# Patient Record
(demographics unavailable — no encounter records)

---

## 2024-10-17 NOTE — HISTORY OF PRESENT ILLNESS
[de-identified] : Spitting up, crying [FreeTextEntry6] : Spitting up once a day to every other day, but mom concerned spit up are large amounts. Can happen with positioning.  Baby is also fussy, not usually around feeds but gas. Will look a little uncomfortable before gas/bowel movement but stools will always come out soft. Mom currently on antibiotics (Dicloxacillin) for mastitis, currently exclusively breastfeeding. Will stool once a day or every other day.

## 2024-10-17 NOTE — HISTORY OF PRESENT ILLNESS
[de-identified] : Spitting up, crying [FreeTextEntry6] : Spitting up once a day to every other day, but mom concerned spit up are large amounts. Can happen with positioning.  Baby is also fussy, not usually around feeds but gas. Will look a little uncomfortable before gas/bowel movement but stools will always come out soft. Mom currently on antibiotics (Dicloxacillin) for mastitis, currently exclusively breastfeeding. Will stool once a day or every other day.

## 2024-10-17 NOTE — DISCUSSION/SUMMARY
[FreeTextEntry1] : Despite reported large spit ups, baby is gaining weight beautifully on EBM. Provided mother reassurance and discussed reflux precautions.   Baby also with infant dyschezia. Discussed this is within normal limits for babies, continue to monitor stool output and appearance, can return if concerns or changes arise.

## 2024-10-22 NOTE — HISTORY OF PRESENT ILLNESS
[Mother] : mother [Breast milk] : breast milk [Hours between feeds ___] : Child is fed every [unfilled] hours [Vitamins ___] : Patient takes [unfilled] vitamins daily [Normal] : Normal [Frequency of stools: ___] : Frequency of stools: [unfilled]  stools [per day] : per day. [Yellow] : yellow [Seedy] : seedy [In Bassinet/Crib] : sleeps in bassinet/crib [On back] : sleeps on back [No] : No cigarette smoke exposure [Water heater temperature set at <120 degrees F] : Water heater temperature set at <120 degrees F [Rear facing car seat in back seat] : Rear facing car seat in back seat [Carbon Monoxide Detectors] : Carbon monoxide detectors at home [Smoke Detectors] : Smoke detectors at home. [Co-sleeping] : no co-sleeping [Loose bedding, pillow, toys, and/or bumpers in crib] : no loose bedding, pillow, toys, and/or bumpers in crib [At risk for exposure to TB] : Not at risk for exposure to Tuberculosis

## 2024-10-22 NOTE — PHYSICAL EXAM
[Alert] : alert [Normocephalic] : normocephalic [Flat Open Anterior [place]] : flat open anterior fontanelle [PERRL] : PERRL [Red Reflex Bilateral] : red reflex bilateral [Normally Placed Ears] : normally placed ears [Auricles Well Formed] : auricles well formed [Clear Tympanic membranes] : clear tympanic membranes [Light reflex present] : light reflex present [Bony landmarks visible] : bony landmarks visible [Nares Patent] : nares patent [Palate Intact] : palate intact [Uvula Midline] : uvula midline [Supple, full passive range of motion] : supple, full passive range of motion [Symmetric Chest Rise] : symmetric chest rise [Clear to Auscultation Bilaterally] : clear to auscultation bilaterally [Regular Rate and Rhythm] : regular rate and rhythm [S1, S2 present] : S1, S2 present [+2 Femoral Pulses] : +2 femoral pulses [Soft] : soft [Bowel Sounds] : bowel sounds present [Normal external genitailia] : normal external genitalia [Patent Vagina] : vagina patent [Normally Placed] : normally placed [No Abnormal Lymph Nodes Palpated] : no abnormal lymph nodes palpated [Symmetric Flexed Extremities] : symmetric flexed extremities [Startle Reflex] : startle reflex present [Suck Reflex] : suck reflex present [Rooting] : rooting reflex present [Palmar Grasp] : palmar grasp reflex present [Plantar Grasp] : plantar grasp reflex present [Symmetric Yvon] : symmetric [place] [Acute Distress] : no acute distress [Discharge] : no discharge [Palpable Masses] : no palpable masses [Murmurs] : no murmurs [Tender] : nontender [Distended] : not distended [Hepatomegaly] : no hepatomegaly [Splenomegaly] : no splenomegaly [Clitoromegaly] : no clitoromegaly [Wright-Ortolani] : negative Wright-Ortolani [Spinal Dimple] : no spinal dimple [Tuft of Hair] : no tuft of hair [Rash and/or lesion present] : no rash/lesion

## 2024-11-18 NOTE — DISCUSSION/SUMMARY
[FreeTextEntry1] : Symptoms likely due to viral URI.  Children can get 6-10 colds per year and they are often clustered during the fall and winter.  Generally if the cough is keeping the child up more than 2 nights in a row in a significant way, that could be 1 concerning reason to consider returning.  Otherwise shortness of breath, lethargy, or irritability that is highly disruptive to sleep could be warning signs that would warrant evaluation as well.  Additionally, fevers that are trending higher after 3 days may be a sign of a complication that warrants evaluation.   If fevers occur, they tend to be at their highest on day one or two of fever, then trend lower.  It is not necessary to treat fevers for the sake of lowering the body temperature.  Treating fevers does not make children safer and does not lower the risk of a febrile seizure (a seizure associated with fever).  Febrile seizures are uncommon, and when they occur do not hurt the child.  But they can be upsetting understandably so to the parents.  However, children that do have an underlying seizure disorder may benefit from treating the fevers.  Fevers do not necessarily respond to fever medication; and if they do not it is not necessarily a bad sign. Patients may appear more ill when the fever is trending higher, but should be acting somewhat better when the fever is down. When fevers are present they typically tend to come a and go a few times each day, and tend to be worse at night.    Give supportive care including treatment for discomfort.  Follow up as needed for fever trend, new, or worsening symptoms.  Provide more frequent fluids and food as the intake is often in smaller more frequent amounts.   Consider nasal saline, suction only if it provides comfort or easier breathing. Follow up as needed for fever trend, new, or worsening symptoms.   Reviewed benefits and limitations of testing.  [email or web address] for reference: Tools and Tips and link to symptom .    Goal: Pt will improve balance one half grade to improve performance and safety of transfers and ambulation in 4 weeks.

## 2024-11-18 NOTE — HISTORY OF PRESENT ILLNESS
[de-identified] : Cough [FreeTextEntry6] : There has been no fever, but a few days runny nose and cough, although not been severely disruptive to sleep or activities.  No irritability or lethargy.  There has been only mild decrease in oral intake, there are minimal GI symptoms and no signs of dehydration.

## 2024-11-18 NOTE — HISTORY OF PRESENT ILLNESS
[de-identified] : Cough [FreeTextEntry6] : There has been no fever, but a few days runny nose and cough, although not been severely disruptive to sleep or activities.  No irritability or lethargy.  There has been only mild decrease in oral intake, there are minimal GI symptoms and no signs of dehydration.

## 2024-12-05 NOTE — PHYSICAL EXAM
[No Acute Distress] : no acute distress [NL] : soft, nontender, nondistended, normal bowel sounds, no hepatosplenomegaly [No Abnormal Lymph Nodes Palpated] : no abnormal lymph nodes palpated [Warm] : warm [de-identified] : Beefy red skin folds to the inguinal area.  Flaky dry patches to the torso and extremities.

## 2024-12-05 NOTE — PHYSICAL EXAM
[No Acute Distress] : no acute distress [NL] : soft, nontender, nondistended, normal bowel sounds, no hepatosplenomegaly [No Abnormal Lymph Nodes Palpated] : no abnormal lymph nodes palpated [Warm] : warm [de-identified] : Beefy red skin folds to the inguinal area.  Flaky dry patches to the torso and extremities.

## 2024-12-05 NOTE — HISTORY OF PRESENT ILLNESS
[FreeTextEntry6] : LINDA IBANEZ is a 3 month old female presenting for complaints of eczema which mom thinks is worsening.  She is breastfeeding only. She uses unscented detergents and soaps and there has been no recent exposures.   There is a family hx of eczema.

## 2024-12-05 NOTE — DISCUSSION/SUMMARY
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  Reassurance was provided to mother regarding her breast milk.   We discussed eczema care at length. Eczema is mild at this time but if it becomes moderate to severe, she should have peanut allergy testing prior to initiating peanut.  Nystatin to intertrigo.   Recommend daily moisturizer and topical steroid prn for atopic dermatitis outbreaks creating discomfort. Vaseline should be used liberally. Topical steroids should be used infrequently and should not be used on the face as they may permanently lighten skin color. Limit hot water bathing during acute flairs. It is OK to bathe baby every 2-3 days during such flair ups. Avoid skin products which are perfumed or have coloring or dye. Hypoallergenic preparations are best for eczema.

## 2024-12-23 NOTE — DISCUSSION/SUMMARY
[] : The components of the vaccine(s) to be administered today are listed in the plan of care. The disease(s) for which the vaccine(s) are intended to prevent and the risks have been discussed with the caretaker.  The risks are also included in the appropriate vaccination information statements which have been provided to the patient's caregiver.  The caregiver has given consent to vaccinate. [FreeTextEntry1] : 4m WCC growing and developing beautifully. Discussed sleeping, feeding, reflux, tummy time, and vaccines. Will get Pentacel, PCV20, and Rota today. Return in 2 months for 6m WCC.  Continue feeding ad sanaz. When in car, patient should be in rear-facing car seat in back seat. Put baby to sleep on back, in own crib with no loose or soft bedding. Lower crib mattress. Help baby to maintain sleep and feeding routines. May offer pacifier if needed. Continue tummy time when awake.

## 2024-12-23 NOTE — PHYSICAL EXAM
[Alert] : alert [Acute Distress] : no acute distress [Normocephalic] : normocephalic [Flat Open Anterior [place]] : flat open anterior fontanelle [Red Reflex] : red reflex bilateral [PERRL] : PERRL [Normally Placed Ears] : normally placed ears [Auricles Well Formed] : auricles well formed [Clear Tympanic membranes] : clear tympanic membranes [Light reflex present] : light reflex present [Bony landmarks visible] : bony landmarks visible [Discharge] : no discharge [Nares Patent] : nares patent [Palate Intact] : palate intact [Uvula Midline] : uvula midline [Palpable Masses] : no palpable masses [Symmetric Chest Rise] : symmetric chest rise [Clear to Auscultation Bilaterally] : clear to auscultation bilaterally [Regular Rate and Rhythm] : regular rate and rhythm [S1, S2 present] : S1, S2 present [Murmurs] : no murmurs [+2 Femoral Pulses] : (+) 2 femoral pulses [Soft] : soft [Tender] : nontender [Distended] : nondistended [Bowel Sounds] : bowel sounds present [Hepatomegaly] : no hepatomegaly [Splenomegaly] : no splenomegaly [Normal External Genitalia] : normal external genitalia [Clitoromegaly] : no clitoromegaly [Normal Vaginal Introitus] : normal vaginal introitus [Patent] : patent [Normally Placed] : normally placed [No Abnormal Lymph Nodes Palpated] : no abnormal lymph nodes palpated [Wright-Ortolani] : negative Wright-Ortolani [Allis Sign] : negative Allis sign [Spinal Dimple] : no spinal dimple [Tuft of Hair] : no tuft of hair [Startle Reflex] : startle reflex present [Plantar Grasp] : plantar grasp reflex present [Symmetric Yvon] : symmetric yvon [Rash or Lesions] : no rash/lesions

## 2024-12-23 NOTE — HISTORY OF PRESENT ILLNESS
[Parents] : parents [Breast milk] : breast milk [Vitamins ___] : Patient takes [unfilled] vitamins daily [Normal] : Normal [Yellow] : yellow [Seedy] : seedy [In Bassinet/Crib] : sleeps in bassinet/crib [On back] : sleeps on back [Tummy time] : tummy time [Sleeps 12-16 hours per 24 hours (including naps)] : sleeps 12-16 hours per 24 hours (including naps) [Water heater temperature set at <120 degrees F] : Water heater temperature set at <120 degrees F [Rear facing car seat in back seat] : Rear facing car seat in back seat [Carbon Monoxide Detectors] : Carbon monoxide detectors at home [Smoke Detectors] : Smoke detectors at home. [FreeTextEntry7] : Mother recently returned to work. Had some distress in the past week, reason why EPDS is high. Now feeling better, currently on Zoloft, receiving help.

## 2025-01-14 NOTE — DISCUSSION/SUMMARY
[FreeTextEntry1] : The pathophysiology of nasolacrimal duct obstruction reviewed.  Massage techniques and judicious use of antibiotics reviewed.  If tearing is persistent past 6 months he should see a pediatric ophthalmologist to discuss the option of probing.  The window of time to treat definitively between 9 to 12 months and the risks of not treating within that window reviewed.

## 2025-02-21 NOTE — HISTORY OF PRESENT ILLNESS
[Parents] : parents [Breast milk] : breast milk [Fruits] : fruits [Vegetables] : vegetables [Normal] : Normal [In Bassinet/Crib] : sleeps in bassinet/crib [Sleeps 12-16 hours per 24 hours (including naps)] : sleeps 12-16 hours per 24 hours (including naps) [Tummy time] : tummy time [Water heater temperature set at <120 degrees F] : Water heater temperature set at <120 degrees F [Rear facing car seat in back seat] : Rear facing car seat in back seat [Carbon Monoxide Detectors] : Carbon monoxide detectors at home [Smoke Detectors] : Smoke detectors at home. [de-identified] : Recently recovered from the flu, likely flu A.

## 2025-02-21 NOTE — PHYSICAL EXAM
[Alert] : alert [Normocephalic] : normocephalic [Flat Open Anterior [place]] : flat open anterior fontanelle [Red Reflex] : red reflex bilateral [PERRL] : PERRL [Normally Placed Ears] : normally placed ears [Auricles Well Formed] : auricles well formed [Clear Tympanic membranes] : clear tympanic membranes [Light reflex present] : light reflex present [Bony landmarks visible] : bony landmarks visible [Nares Patent] : nares patent [Palate Intact] : palate intact [Uvula Midline] : uvula midline [Supple, full passive range of motion] : supple, full passive range of motion [Symmetric Chest Rise] : symmetric chest rise [Clear to Auscultation Bilaterally] : clear to auscultation bilaterally [Regular Rate and Rhythm] : regular rate and rhythm [S1, S2 present] : S1, S2 present [+2 Femoral Pulses] : (+) 2 femoral pulses [Soft] : soft [Bowel Sounds] : bowel sounds present [Normal External Genitalia] : normal external genitalia [Patent] : patent [Normally Placed] : normally placed [No Abnormal Lymph Nodes Palpated] : no abnormal lymph nodes palpated [Symmetric Buttocks Creases] : symmetric buttocks creases [Plantar Grasp] : plantar grasp reflex present [Cranial Nerves Grossly Intact] : cranial nerves grossly intact [Acute Distress] : no acute distress [Discharge] : no discharge [Tooth Eruption] : no tooth eruption [Palpable Masses] : no palpable masses [Murmurs] : no murmurs [Tender] : nontender [Distended] : nondistended [Hepatomegaly] : no hepatomegaly [Splenomegaly] : no splenomegaly [Clitoromegaly] : no clitoromegaly [Wright-Ortolani] : negative Wright-Ortolani [Allis Sign] : negative Allis sign [Spinal Dimple] : no spinal dimple [Tuft of Hair] : no tuft of hair [Rash or Lesions] : no rash/lesions [de-identified] : labial adhesion with urethral opening

## 2025-02-21 NOTE — DISCUSSION/SUMMARY
[] : The components of the vaccine(s) to be administered today are listed in the plan of care. The disease(s) for which the vaccine(s) are intended to prevent and the risks have been discussed with the caretaker.  The risks are also included in the appropriate vaccination information statements which have been provided to the patient's caregiver.  The caregiver has given consent to vaccinate. [FreeTextEntry1] : 6m WCC growing and developing beautifully. Discussed sleeping, feeding, solids, tummy time, and vaccines. Will get Vaxelis, PCV20, and Rota today, as well as Flu #1. Return in 3 months for 9m WCC.  Family lives in old house, recently repainted, but unsure about lead pipes. Will look into this matter, consider lead bloodwork earlier if needed at next 9m WCC.  Recommend breastfeeding, 8-12 feedings per day. If formula is needed, 2-4 oz every 3-4 hrs. Introduce single-ingredient foods rich in iron, one at a time. Incorporate up to 4 oz of fluorinated water daily in a sippy cup. When teeth erupt wipe daily with washcloth. When in car, patient should be in rear-facing car seat in back seat. Put baby to sleep on back, in own crib with no loose or soft bedding. Lower crib mattress. Help baby to maintain sleep and feeding routines. May offer pacifier if needed. Continue tummy time when awake. Ensure home is safe since baby is now more mobile. Do not use infant walker. Read aloud to baby.

## 2025-04-10 NOTE — HISTORY OF PRESENT ILLNESS
[de-identified] : Poss pink eye  [FreeTextEntry6] : woke up with left eye pink and crusting today from sleep. Not completely crusted shut but lots of yellow thick discharge.   Also used betamethasone cream without much changes x 2 weeks.

## 2025-04-10 NOTE — PHYSICAL EXAM
[Discharge] : discharge [Conjuctival Injection] : conjunctival injection [Left] : (left) [Clear] : right tympanic membrane clear [NL] : nonerythematous oropharynx [FreeTextEntry6] : labial adhesions

## 2025-04-10 NOTE — DISCUSSION/SUMMARY
[FreeTextEntry1] : Baby with left bacterial conjunctivitis. Recommend supportive care with warm compresses and application of antibiotic eye drops if prescribed. Potential side effect of drops include but not limited to worsening erythema of eye or burning with application. Return if symptoms worsen.  Will change treatment to estrogen cream for labial adhesions. Use 2 weeks then stop, will reassess at 9m WCC.

## 2025-04-10 NOTE — HISTORY OF PRESENT ILLNESS
[de-identified] : Poss pink eye  [FreeTextEntry6] : woke up with left eye pink and crusting today from sleep. Not completely crusted shut but lots of yellow thick discharge.   Also used betamethasone cream without much changes x 2 weeks. no...

## 2025-04-18 NOTE — DISCUSSION/SUMMARY
[FreeTextEntry1] : Likey norovirus. In order to maintain hydration consume "oral rehydration solution," such as Pedialyte or low calorie sports drinks. If vomiting, try to give child a few teaspoons of fluid every few minutes. Avoid drinking juice or soda. These can make diarrhea worse. If tolerating solids, its best to consume lean meats, fruits, vegetables, and whole-grain breads and cereals. Avoid eating foods with a lot of fat or sugar, which can make symptoms worse.  However, we are always available to tlk  As of now i think the head is not a cause of the sx Will follow clinically and evolve.

## 2025-04-18 NOTE — HISTORY OF PRESENT ILLNESS
[de-identified] : vomit and diarrhea [FreeTextEntry6] : Fell backwards sitting cired briefly, no signs or symptoms. But 1/2 hour later she developed vomiting and iarrhea. Acting well. Afeb and no rashes [facility]

## 2025-05-02 NOTE — HISTORY OF PRESENT ILLNESS
[de-identified] : rash in between thighs  [FreeTextEntry6] : Rash on bilateral thigh folds that has mildly improved with diaper cream. Gets better overnight then worse throughout the day.   Also would like recheck on labial adhesions.

## 2025-05-02 NOTE — DISCUSSION/SUMMARY
[FreeTextEntry1] : Likely candidal dermatitis, will represcribe Nystatin cream. Keep skin clean and dry. Do not share unwashed clothes, sports gear, or towels with other people. Wash with soap and shampoo after physical activity. If no improvement after treatment return to office.  Labial adhesions still present, allow area to heal completely then restart estrogen cream BID x 1-2 weeks.

## 2025-05-02 NOTE — PHYSICAL EXAM
[Labial Adhesions] : labial adhesions [Moves All Extremities x 4] : moves all extremities x4 [NL] : warm, clear [de-identified] : erythematous thigh and intertriginous folds with satellite lesions

## 2025-05-17 NOTE — DISCUSSION/SUMMARY
[FreeTextEntry1] : 8 mo here with viral illness found to have R OME (bulging) and persistent labial adhesions.  Continue use of Premarin topical for labial adhesions for total of 2 weeks. Has well f/u in 2 weeks for recheck with Dr. Iniguez.  I discussed s/s of ear infection with parents and recommended that if they are concerned for worsening in symptoms that they should return for recheck.  Supportive measures for upper respiratory infection were discussed. Such measures include use of nasal saline and suction as needed to clear the nasal passages, increasing fluids, hot showers or steam from the bathroom, propping the child up on a second pillow (for children > 1 year old), use of an OTC home remedy such as vapo rub for comfort and giving 1 tablespoon of honey an hour before bedtime for cough. (Honey is only to be given for children 1 year of age and older) Tylenol can be used every 4 hours as needed for fever or pain and Motrin can be used every 6 hours as needed for fever or pain.  If child has a fever of 100.4 or more or symptoms are worsening at any time, return for recheck or seek other medical attention. Follow up PRN worsening symptoms, persistent fever of 100.4 or more or failure to improve.

## 2025-05-17 NOTE — HISTORY OF PRESENT ILLNESS
[de-identified] : cough  [FreeTextEntry6] : LINDA IBANEZ is a 8 month old female presenting for complaints of cough and congestion. Nof ever.  +sick contacts  Eating/drinking Some cough overnight.   Also has hx of labial adhesions and has been using the premarin x 4 days and would like to know if they should stop.

## 2025-05-17 NOTE — PHYSICAL EXAM
[No Acute Distress] : no acute distress [Clear] : left tympanic membrane not clear [Bulging] : bulging [Clear Effusion] : clear effusion [Transmitted Upper Airway Sounds] : transmitted upper airway sounds [Labial Adhesions] : labial adhesions [NL] : no abnormal lymph nodes palpated [Normotonic] : normotonic [Warm] : warm

## 2025-05-27 NOTE — DISCUSSION/SUMMARY
[FreeTextEntry1] : Baby presenting for 9m WCC, gaining weight and developing beautifully. Continue breast milk or formula as desired. Increase table foods, 3 meals with 2-3 snacks per day. Incorporate up to 6 oz of fluorinated water daily in a sippy cup. Discussed weaning of bottle and pacifier. Wipe teeth daily with washcloth. When in car, patient should be in rear-facing car seat in back seat. Put baby to sleep in own crib with no loose or soft bedding. Lower crib mattress. Help baby to maintain consistent daily routines and sleep schedule. Recognize stranger anxiety. Ensure home is safe since baby is increasingly mobile. Be within arm's reach of baby at all times. Use consistent, positive discipline. Avoid screen time. Read aloud to baby.  UTD on vaccines. Double checked house pipes - not lead. Labial adhesions resolved, stop premarin cream. Will see again in 3 months for 12m WCC, will do bloodwork at this time.

## 2025-05-27 NOTE — HISTORY OF PRESENT ILLNESS
[Parents] : parents [Breast milk] : breast milk [Fruit] : fruit [Vegetables] : vegetables [Cereal] : cereal [Meat] : meat [Eggs] : eggs [Fish] : fish [Peanut] : peanut [Dairy] : dairy [Normal] : Normal [In Crib] : sleeps in crib [Sleeps 12-16 hours per 24 hours (including naps)] : sleeps 12-16 hours per 24 hours (including naps) [Water heater temperature set at <120 degrees F] : Water heater temperature set at <120 degrees F [Rear facing car seat in  back seat] : Rear facing car seat in  back seat [Carbon Monoxide Detectors] : Carbon monoxide detectors [Smoke Detectors] : Smoke detectors [None] : Primary Fluoride Source: None [Up to date] : Up to date [FreeTextEntry7] : Recently recovering from URI and infection.

## 2025-05-27 NOTE — PHYSICAL EXAM
[Alert] : alert [Normocephalic] : normocephalic [Flat Open Anterior [place]] : flat open anterior fontanelle [Red Reflex] : red reflex bilateral [PERRL] : PERRL [Normally Placed Ears] : normally placed ears [Auricles Well Formed] : auricles well formed [Clear Tympanic membranes] : clear tympanic membranes [Light reflex present] : light reflex present [Bony landmarks visible] : bony landmarks visible [Nares Patent] : nares patent [Palate Intact] : palate intact [Uvula Midline] : uvula midline [Supple, full passive range of motion] : supple, full passive range of motion [Symmetric Chest Rise] : symmetric chest rise [Clear to Auscultation Bilaterally] : clear to auscultation bilaterally [Regular Rate and Rhythm] : regular rate and rhythm [S1, S2 present] : S1, S2 present [+2 Femoral Pulses] : (+) 2 femoral pulses [Soft] : soft [Bowel Sounds] : bowel sounds present [Normal External Genitalia] : normal external genitalia [Normal Vaginal Introitus] : normal vaginal introitus [No Abnormal Lymph Nodes Palpated] : no abnormal lymph nodes palpated [Symmetric abduction and rotation of hips] : symmetric abduction and rotation of hips [Straight] : straight [Cranial Nerves Grossly Intact] : cranial nerves grossly intact [Acute Distress] : no acute distress [Excessive Tearing] : no excessive tearing [Discharge] : no discharge [Palpable Masses] : no palpable masses [Murmurs] : no murmurs [Tender] : nontender [Distended] : nondistended [Hepatomegaly] : no hepatomegaly [Splenomegaly] : no splenomegaly [Clitoromegaly] : no clitoromegaly [Allis Sign] : negative Allis sign [Rash or Lesions] : no rash/lesions [de-identified] : small labial adhesion superiorly, improved from prior

## 2025-05-30 NOTE — HISTORY OF PRESENT ILLNESS
[de-identified] : Hives  [FreeTextEntry6] : Had hives on the body two nights ago 15-20 minutes after eating tree nut puffs and yogurt with berries. No vomiting, diarrhea, swelling of mouth/lips/tongue, wheezing, coughing or choking. Hives self resolved within 30 mins-1hr. Has not recurred since. Has had these food items multiple times in the past without any symptoms.

## 2025-05-30 NOTE — DISCUSSION/SUMMARY
[FreeTextEntry1] : I spoke to parent on the phone at the time of the hives and we deferred ED visit as my concern for anaphylaxis was low given lack of accompanying symptom in addition to hives (no involvement of 2+ systems). Referral to allergist placed. Will send for bloodwork testing in addition to upcoming 12m routine labwork, but advised to hold off until seen by allergist. Return precautions given.

## 2025-07-08 NOTE — PHYSICAL EXAM
[Clear] : right tympanic membrane clear [Erythema] : no erythema [Bulging] : not bulging [Purulent Effusion] : no purulent effusion [Clear to Auscultation Bilaterally] : clear to auscultation bilaterally [NL] : normotonic [de-identified] : blister on anterior tongue [de-identified] : blisters on the hands, feet, buttocks

## 2025-07-08 NOTE — HISTORY OF PRESENT ILLNESS
[de-identified] : Fussy, Loss of Appetite [FreeTextEntry6] : Returned from [place] yesterday, was fussy last night and developed fever overnight. Now developing new rash on the buttocks and hands. Also refusing nursing but taking sips of water.

## 2025-07-08 NOTE — DISCUSSION/SUMMARY
[FreeTextEntry1] : Baby with hand foot mouth disease. Continue supportive care. Follow up as needed for new fever trend or worsening symptoms.